# Patient Record
Sex: FEMALE | Race: WHITE | NOT HISPANIC OR LATINO | Employment: FULL TIME | URBAN - METROPOLITAN AREA
[De-identification: names, ages, dates, MRNs, and addresses within clinical notes are randomized per-mention and may not be internally consistent; named-entity substitution may affect disease eponyms.]

---

## 2021-08-05 ENCOUNTER — OFFICE VISIT (OUTPATIENT)
Dept: URGENT CARE | Facility: CLINIC | Age: 21
End: 2021-08-05
Payer: COMMERCIAL

## 2021-08-05 VITALS
TEMPERATURE: 98 F | BODY MASS INDEX: 22.73 KG/M2 | OXYGEN SATURATION: 100 % | RESPIRATION RATE: 18 BRPM | WEIGHT: 150 LBS | HEIGHT: 68 IN | DIASTOLIC BLOOD PRESSURE: 69 MMHG | HEART RATE: 91 BPM | SYSTOLIC BLOOD PRESSURE: 115 MMHG

## 2021-08-05 DIAGNOSIS — U07.1 COVID-19 VIRUS DETECTED: ICD-10-CM

## 2021-08-05 DIAGNOSIS — U07.1 COVID-19 VIRUS INFECTION: Primary | ICD-10-CM

## 2021-08-05 DIAGNOSIS — Z03.818 ENCOUNTER FOR OBSERVATION FOR SUSPECTED EXPOSURE TO OTHER BIOLOGICAL AGENTS RULED OUT: ICD-10-CM

## 2021-08-05 LAB
CTP QC/QA: YES
SARS-COV-2 RDRP RESP QL NAA+PROBE: POSITIVE

## 2021-08-05 PROCEDURE — 99204 OFFICE O/P NEW MOD 45 MIN: CPT | Mod: S$GLB,,, | Performed by: PHYSICIAN ASSISTANT

## 2021-08-05 PROCEDURE — 3078F DIAST BP <80 MM HG: CPT | Mod: CPTII,S$GLB,, | Performed by: PHYSICIAN ASSISTANT

## 2021-08-05 PROCEDURE — 3074F PR MOST RECENT SYSTOLIC BLOOD PRESSURE < 130 MM HG: ICD-10-PCS | Mod: CPTII,S$GLB,, | Performed by: PHYSICIAN ASSISTANT

## 2021-08-05 PROCEDURE — 3008F BODY MASS INDEX DOCD: CPT | Mod: CPTII,S$GLB,, | Performed by: PHYSICIAN ASSISTANT

## 2021-08-05 PROCEDURE — 1159F MED LIST DOCD IN RCRD: CPT | Mod: CPTII,S$GLB,, | Performed by: PHYSICIAN ASSISTANT

## 2021-08-05 PROCEDURE — U0002 COVID-19 LAB TEST NON-CDC: HCPCS | Mod: QW,S$GLB,, | Performed by: PHYSICIAN ASSISTANT

## 2021-08-05 PROCEDURE — 99204 PR OFFICE/OUTPT VISIT, NEW, LEVL IV, 45-59 MIN: ICD-10-PCS | Mod: S$GLB,,, | Performed by: PHYSICIAN ASSISTANT

## 2021-08-05 PROCEDURE — 1159F PR MEDICATION LIST DOCUMENTED IN MEDICAL RECORD: ICD-10-PCS | Mod: CPTII,S$GLB,, | Performed by: PHYSICIAN ASSISTANT

## 2021-08-05 PROCEDURE — U0002: ICD-10-PCS | Mod: QW,S$GLB,, | Performed by: PHYSICIAN ASSISTANT

## 2021-08-05 PROCEDURE — 3074F SYST BP LT 130 MM HG: CPT | Mod: CPTII,S$GLB,, | Performed by: PHYSICIAN ASSISTANT

## 2021-08-05 PROCEDURE — 3078F PR MOST RECENT DIASTOLIC BLOOD PRESSURE < 80 MM HG: ICD-10-PCS | Mod: CPTII,S$GLB,, | Performed by: PHYSICIAN ASSISTANT

## 2021-08-05 PROCEDURE — 3008F PR BODY MASS INDEX (BMI) DOCUMENTED: ICD-10-PCS | Mod: CPTII,S$GLB,, | Performed by: PHYSICIAN ASSISTANT

## 2021-08-05 RX ORDER — LEVONORGESTREL AND ETHINYL ESTRADIOL 0.15-0.03
1 KIT ORAL DAILY
COMMUNITY
Start: 2021-06-29 | End: 2022-06-21 | Stop reason: SDUPTHER

## 2021-08-05 RX ORDER — DULOXETIN HYDROCHLORIDE 20 MG/1
20 CAPSULE, DELAYED RELEASE ORAL
COMMUNITY
Start: 2020-12-04

## 2021-08-05 RX ORDER — PLECANATIDE 3 MG/1
1 TABLET ORAL DAILY
COMMUNITY
Start: 2020-09-20

## 2021-08-05 RX ORDER — LEVOTHYROXINE SODIUM 25 UG/1
1 TABLET ORAL EVERY MORNING
COMMUNITY
Start: 2020-12-04 | End: 2022-06-21

## 2023-01-26 ENCOUNTER — OFFICE VISIT (OUTPATIENT)
Dept: URGENT CARE | Facility: CLINIC | Age: 23
End: 2023-01-26
Payer: COMMERCIAL

## 2023-01-26 VITALS
BODY MASS INDEX: 21.95 KG/M2 | RESPIRATION RATE: 18 BRPM | DIASTOLIC BLOOD PRESSURE: 68 MMHG | HEIGHT: 68 IN | HEART RATE: 83 BPM | TEMPERATURE: 99 F | WEIGHT: 144.81 LBS | SYSTOLIC BLOOD PRESSURE: 99 MMHG | OXYGEN SATURATION: 99 %

## 2023-01-26 DIAGNOSIS — R05.9 COUGH, UNSPECIFIED TYPE: ICD-10-CM

## 2023-01-26 DIAGNOSIS — U07.1 COVID-19 VIRUS INFECTION: Primary | ICD-10-CM

## 2023-01-26 DIAGNOSIS — R09.81 SINUS CONGESTION: ICD-10-CM

## 2023-01-26 LAB
CTP QC/QA: YES
SARS-COV-2 AG RESP QL IA.RAPID: POSITIVE

## 2023-01-26 PROCEDURE — 1159F PR MEDICATION LIST DOCUMENTED IN MEDICAL RECORD: ICD-10-PCS | Mod: CPTII,S$GLB,, | Performed by: PHYSICIAN ASSISTANT

## 2023-01-26 PROCEDURE — 1159F MED LIST DOCD IN RCRD: CPT | Mod: CPTII,S$GLB,, | Performed by: PHYSICIAN ASSISTANT

## 2023-01-26 PROCEDURE — 3008F BODY MASS INDEX DOCD: CPT | Mod: CPTII,S$GLB,, | Performed by: PHYSICIAN ASSISTANT

## 2023-01-26 PROCEDURE — 1160F PR REVIEW ALL MEDS BY PRESCRIBER/CLIN PHARMACIST DOCUMENTED: ICD-10-PCS | Mod: CPTII,S$GLB,, | Performed by: PHYSICIAN ASSISTANT

## 2023-01-26 PROCEDURE — 3078F DIAST BP <80 MM HG: CPT | Mod: CPTII,S$GLB,, | Performed by: PHYSICIAN ASSISTANT

## 2023-01-26 PROCEDURE — 99213 OFFICE O/P EST LOW 20 MIN: CPT | Mod: S$GLB,,, | Performed by: PHYSICIAN ASSISTANT

## 2023-01-26 PROCEDURE — 3074F SYST BP LT 130 MM HG: CPT | Mod: CPTII,S$GLB,, | Performed by: PHYSICIAN ASSISTANT

## 2023-01-26 PROCEDURE — 1160F RVW MEDS BY RX/DR IN RCRD: CPT | Mod: CPTII,S$GLB,, | Performed by: PHYSICIAN ASSISTANT

## 2023-01-26 PROCEDURE — 99213 PR OFFICE/OUTPT VISIT, EST, LEVL III, 20-29 MIN: ICD-10-PCS | Mod: S$GLB,,, | Performed by: PHYSICIAN ASSISTANT

## 2023-01-26 PROCEDURE — 3074F PR MOST RECENT SYSTOLIC BLOOD PRESSURE < 130 MM HG: ICD-10-PCS | Mod: CPTII,S$GLB,, | Performed by: PHYSICIAN ASSISTANT

## 2023-01-26 PROCEDURE — 3078F PR MOST RECENT DIASTOLIC BLOOD PRESSURE < 80 MM HG: ICD-10-PCS | Mod: CPTII,S$GLB,, | Performed by: PHYSICIAN ASSISTANT

## 2023-01-26 PROCEDURE — 3008F PR BODY MASS INDEX (BMI) DOCUMENTED: ICD-10-PCS | Mod: CPTII,S$GLB,, | Performed by: PHYSICIAN ASSISTANT

## 2023-01-26 PROCEDURE — 87811 SARS CORONAVIRUS 2 ANTIGEN POCT, MANUAL READ: ICD-10-PCS | Mod: QW,S$GLB,, | Performed by: PHYSICIAN ASSISTANT

## 2023-01-26 PROCEDURE — 87811 SARS-COV-2 COVID19 W/OPTIC: CPT | Mod: QW,S$GLB,, | Performed by: PHYSICIAN ASSISTANT

## 2023-01-26 RX ORDER — PROMETHAZINE HYDROCHLORIDE AND DEXTROMETHORPHAN HYDROBROMIDE 6.25; 15 MG/5ML; MG/5ML
5 SYRUP ORAL EVERY 6 HOURS PRN
Qty: 118 ML | Refills: 0 | Status: SHIPPED | OUTPATIENT
Start: 2023-01-26 | End: 2023-09-13

## 2023-01-26 NOTE — LETTER
[address] ? [place], [address] ? Phone [phone] ? Fax             Return to Work/School    Patient: Rosalva Strange  YOB: 2000   Date: 01/26/2023      To Whom It May Concern:     Rosalva Strange was in contact with/seen in my office on 01/26/2023. COVID-19 is present in our communities across the state. Not all patients are eligible or appropriate to be tested. In this situation, she meets the following criteria:     Rosalva Strange has met the criteria for COVID-19 testing and has a POSITIVE result. She can return to work/school once they are asymptomatic for 24 hours without the use of fever reducing medications AND at least 5 days from the start of symptoms- may return on 1/29/23.  Patient does NOT need to be re-tested to return to work/school.        If you have any questions or concerns, or if I can be of further assistance, please do not hesitate to contact me.     Sincerely,    Gypsy Jorgensen PA-C

## 2023-01-26 NOTE — PATIENT INSTRUCTIONS
You have tested positive for COVID-19 today.      Please note that patients who test positive for COVID-19 are required by the CDC to undergo isolation for 5 days after their symptoms first began.   - If you tested positive and do not have symptoms, you must isolate for 5 days starting on the day of the positive test.   - If you tested positive and have symptoms, you must isolate for 5 days starting on the day of the first symptoms,  not the day of the positive test.    This is the most important part, both the CDC and the [facility] emphasize that you do not test out of isolation.  In fact, we do not retest if you were positive in the last 90 days.    After 5 days, if your symptoms have improved and you have not had fever on day 5, you can return to the community on day 6- NO TESTING REQUIRED!     After your 5 days of isolation are completed, the CDC recommends strict mask use for the first 5 days that you come out of isolation.     During quarantine:   Separate yourself from other people and animals in your home.  Call ahead before visiting your doctor.  Wear a facemask.  Cover your coughs and sneezes.  Wash your hands often with soap and water; hand  can be used, too.  Avoid sharing personal household items.  Wipe down surfaces used daily.  Monitor your symptoms. Seek prompt medical attention if your illness is worsening (e.g., difficulty breathing).   Before seeking care, call your healthcare provider.  If you have a medical emergency and need to call 911, notify the dispatch personnel that you have, or are being evaluated for COVID-19. If possible, put on a facemask before emergency medical services arrive.         CDC Testing and Quarantine Guidelines for household members, intimate partners, and caregivers in a home setting of a known-positive COVID-19 person:    ·     A 'close exposure' is defined as anyone who has had an exposure (masked or unmasked) to a known COVID -19 positive person within 6 feet of  someone for a cumulative total of 15 minutes or more over a 24-hour period.    ·     Vaccinated: patients who have been boosted or completed the primary series of Pfizer or Moderna vaccine within the last 6 months or completed the primary series of J&J vaccine within the last 2 months and/or had a positive test within 90 days   - do NOT need to quarantine after contact with someone who had COVID-19 unless they have symptoms.   - should get tested 3-5 days after their exposure (if they have not had a positive test within the last 90 days), even if they don't have symptoms and wear a mask indoors in public for 10 days following exposure or until their test result is negative on day 5.  - If you develop symptoms test and quarantine.    ·     Unvaccinated, or are more than six months out from their second mRNA dose (or more than 2 months after the J&J vaccine) and not yet boosted,  and/or NOT had a positive test within 90 days and meet 'close exposure'  - you are required by CDC guidelines to quarantine for at least 5 days from time of exposure followed by 5 days of strict masking. It is recommended, but not required to test after 5 days, unless you develop symptoms, in which case you should test at that time.  - If you do decide to test at 5 days and are asymptomatic, the risk is that if you test without symptoms (on Day 5 for example) and you are positive, your 5 day isolation begins on that day, and you turned your 5 day quarantine into 10 days.  - If your exposure does not meet the above definition, you can return to your normal daily activities to include social distancing, wearing a mask and frequent handwashing.       Close contacts should also follow these recommendations:  Make sure that you understand and can help the patient follow their provider's instructions for medication(s) and care. You should help the patient with basic needs in the home and provide support for getting groceries, prescriptions, and  other personal needs.  Monitor the patient's symptoms. If the patient is getting sicker, call his or her healthcare provider and tell them that the patient has laboratory-confirmed COVID-19. If the patient has a medical emergency and you need to call 911, notify the dispatch personnel that the patient has, or is being evaluated for COVID-19.  Household members should stay in another room or be  from the patient. Household members should use a separate bedroom and bathroom, if available.  Prohibit visitors.  Household members should care for any pets in the home.  Make sure that shared spaces in the home have good air flow, such as by an air conditioner or an opened window, weather permitting.  Perform hand hygiene frequently. Wash your hands often with soap and water for at least 20 seconds or use an alcohol-based hand  (that contains > 60% alcohol) covering all surfaces of your hands and rubbing them together until they feel dry. Soap and water should be used preferentially.  Avoid touching your eyes, nose, and mouth.  The patient should wear a facemask. If the patient is not able to wear a facemask (for example, because it causes trouble breathing), caregivers should wear a mask when they are in the same room as the patient.  Wear a disposable facemask and gloves when you touch or have contact with the patient's blood, stool, or body fluids, such as saliva, sputum, nasal mucus, vomit, urine.  Throw out disposable facemasks and gloves after using them. Do not reuse.  When removing personal protective equipment, first remove and dispose of gloves. Then, immediately clean your hands with soap and water or alcohol-based hand . Next, remove and dispose of facemask, and immediately clean your hands again with soap and water or alcohol-based hand .  You should not share dishes, drinking glasses, cups, eating utensils, towels, bedding, or other items with the patient. After the patient  uses these items, you should wash them thoroughly (see below Wash laundry thoroughly).  Clean all high-touch surfaces, such as counters, tabletops, doorknobs, bathroom fixtures, toilets, phones, keyboards, tablets, and bedside tables, every day. Also, clean any surfaces that may have blood, stool, or body fluids on them.  Use a household cleaning spray or wipe, according to the label instructions. Labels contain instructions for safe and effective use of the cleaning product including precautions you should take when applying the product, such as wearing gloves and making sure you have good ventilation during use of the product.  Wash laundry thoroughly.  Immediately remove and wash clothes or bedding that have blood, stool, or body fluids on them.  Wear disposable gloves while handling soiled items and keep soiled items away from your body. Clean your hands (with soap and water or an alcohol-based hand ) immediately after removing your gloves.  Read and follow directions on labels of laundry or clothing items and detergent. In general, using a normal laundry detergent according to washing machine instructions and dry thoroughly using the warmest temperatures recommended on the clothing label.  Place all used disposable gloves, facemasks, and other contaminated items in a lined container before disposing of them with other household waste. Clean your hands (with soap and water or an alcohol-based hand ) immediately after handling these items. Soap and water should be used preferentially if hands are visibly dirty.  Discuss any additional questions with your state or local health department or healthcare provider. Check available hours when contacting your local health department.     You must understand that you've received an Urgent Care treatment only and that you may be released before all your medical problems are known or treated. You, the patient, will arrange for follow up care as  instructed. Follow up with your PCP or specialty clinic as directed within 2-5 days if not improved or as needed.  You can call [phone] to schedule an appointment with the appropriate provider.  If your condition worsens we recommend that you receive another evaluation at the emergency room immediately or contact your primary medical clinics after hours call service to discuss your concerns.  Please return here or go to the Emergency Department for any concerns or worsening of condition.       If we discussed the COVID surveillance/home monitoring program, you will also get a call from [facility] at the [place] or [facility] location to get a pulse oximeter to monitor your blood oxygen level.  This will be followed by a COVID surveillance team daily through [employer] (available on computer or through mobile hermila).

## 2023-01-26 NOTE — PROGRESS NOTES
"Subjective:       Patient ID: Rosalva Strange is a 22 y.o. female.    Vitals:  height is 5' 8.19" (1.732 m) and weight is 65.7 kg (144 lb 13.5 oz). Her tympanic temperature is 98.8 °F (37.1 °C). Her blood pressure is 99/68 and her pulse is 83. Her respiration is 18 and oxygen saturation is 99%.     Chief Complaint: Sinus Problem    Pt is here today with sinus congestion and cough x 5 days. Pt took an at home covid test which came back positive.  No fever, wheezing, shortness of breath.  Cough seems to be worse at night.  Patient is still waking up in the night despite taking NyQuil.    Sinus Problem  This is a new problem. The current episode started in the past 7 days. The problem is unchanged. There has been no fever. Her pain is at a severity of 0/10. She is experiencing no pain. Associated symptoms include congestion, coughing, a hoarse voice, sinus pressure and a sore throat. Pertinent negatives include no chills, diaphoresis, ear pain, headaches, neck pain, shortness of breath, sneezing or swollen glands. Past treatments include acetaminophen (dayquil, nyquil, sudafed, mucinex dm, cough drops). The treatment provided no relief.     Constitution: Negative for chills, sweating and fever.   HENT:  Positive for congestion, sinus pressure and sore throat. Negative for ear pain.    Neck: Negative for neck pain.   Cardiovascular: Negative.    Respiratory:  Positive for cough and sputum production. Negative for shortness of breath.    Gastrointestinal: Negative.    Allergic/Immunologic: Negative for sneezing.   Neurological:  Negative for headaches.     Objective:      Physical Exam   Constitutional: She appears well-developed.  Non-toxic appearance. She appears ill. No distress.   HENT:   Head: Normocephalic and atraumatic.   Ears:   Right Ear: Tympanic membrane, external ear and ear canal normal.   Left Ear: Tympanic membrane, external ear and ear canal normal.   Nose: Congestion present.   Eyes: Conjunctivae and " EOM are normal.   Neck: Neck supple.   Pulmonary/Chest: Effort normal and breath sounds normal.   Abdominal: Normal appearance.   Musculoskeletal: Normal range of motion.         General: Normal range of motion.   Neurological: no focal deficit. She is alert. She displays no weakness. Gait normal.   Skin: Skin is warm, dry, not diaphoretic, not pale and no rash.   Psychiatric: Her behavior is normal.       Results for orders placed or performed in visit on 01/26/23   SARS Coronavirus 2 Antigen, POCT Manual Read   Result Value Ref Range    SARS Coronavirus 2 Antigen Positive (A) Negative     Acceptable Yes        Assessment:       1. COVID-19 virus infection    2. Sinus congestion    3. Cough, unspecified type            Plan:       - Rapid COVID-19 positive    - Covid Risk Score:  0  Pt is considered low risk (score < 3) and therefore does not meet criteria for Paxlovid.  - Advised patient to stay home and self quarantine for 5 days from onset of symptoms. Advised must be fever free for at least 24 hours to discontinue isolation.  -Tylenol as needed for fever or pain.   - Promethazine dm nightly prn; discussed may cause drowsiness.   - Strict ED precautions given for any emergent symptoms.    COVID-19 virus infection    Sinus congestion  -     SARS Coronavirus 2 Antigen, POCT Manual Read    Cough, unspecified type  -     promethazine-dextromethorphan (PROMETHAZINE-DM) 6.25-15 mg/5 mL Syrp; Take 5 mLs by mouth every 6 (six) hours as needed (cough). May cause drowsiness.  Dispense: 118 mL; Refill: 0

## 2023-08-11 ENCOUNTER — HOSPITAL ENCOUNTER (OUTPATIENT)
Dept: RADIOLOGY | Facility: CLINIC | Age: 23
Discharge: HOME | End: 2023-08-11
Attending: NURSE PRACTITIONER
Payer: COMMERCIAL

## 2023-08-11 ENCOUNTER — OFFICE VISIT (OUTPATIENT)
Dept: URGENT CARE | Facility: CLINIC | Age: 23
End: 2023-08-11
Payer: COMMERCIAL

## 2023-08-11 VITALS
WEIGHT: 144 LBS | HEART RATE: 75 BPM | RESPIRATION RATE: 18 BRPM | TEMPERATURE: 98 F | SYSTOLIC BLOOD PRESSURE: 112 MMHG | BODY MASS INDEX: 21.82 KG/M2 | HEIGHT: 68 IN | OXYGEN SATURATION: 99 % | DIASTOLIC BLOOD PRESSURE: 73 MMHG

## 2023-08-11 DIAGNOSIS — M79.672 LEFT FOOT PAIN: Primary | ICD-10-CM

## 2023-08-11 PROCEDURE — 96372 PR INJECTION,THERAP/PROPH/DIAG2ST, IM OR SUBCUT: ICD-10-PCS | Mod: S$GLB,,, | Performed by: NURSE PRACTITIONER

## 2023-08-11 PROCEDURE — 73630 X-RAY EXAM OF FOOT: CPT | Mod: LT,S$GLB,, | Performed by: STUDENT IN AN ORGANIZED HEALTH CARE EDUCATION/TRAINING PROGRAM

## 2023-08-11 PROCEDURE — 73630 XR FOOT COMPLETE 3 VIEW LEFT: ICD-10-PCS | Mod: LT,S$GLB,, | Performed by: STUDENT IN AN ORGANIZED HEALTH CARE EDUCATION/TRAINING PROGRAM

## 2023-08-11 PROCEDURE — 99214 OFFICE O/P EST MOD 30 MIN: CPT | Mod: 25,S$GLB,, | Performed by: NURSE PRACTITIONER

## 2023-08-11 PROCEDURE — 96372 THER/PROPH/DIAG INJ SC/IM: CPT | Mod: S$GLB,,, | Performed by: NURSE PRACTITIONER

## 2023-08-11 PROCEDURE — 99214 PR OFFICE/OUTPT VISIT, EST, LEVL IV, 30-39 MIN: ICD-10-PCS | Mod: 25,S$GLB,, | Performed by: NURSE PRACTITIONER

## 2023-08-11 RX ORDER — KETOROLAC TROMETHAMINE 30 MG/ML
30 INJECTION, SOLUTION INTRAMUSCULAR; INTRAVENOUS
Status: COMPLETED | OUTPATIENT
Start: 2023-08-11 | End: 2023-08-11

## 2023-08-11 RX ADMIN — KETOROLAC TROMETHAMINE 30 MG: 30 INJECTION, SOLUTION INTRAMUSCULAR; INTRAVENOUS at 07:08

## 2023-08-11 NOTE — PROGRESS NOTES
"Subjective:      Patient ID: Rosalva Strange is a 23 y.o. female.    Vitals:  height is 5' 8.19" (1.732 m) and weight is 65.3 kg (144 lb). Her oral temperature is 98.3 °F (36.8 °C). Her blood pressure is 112/73 and her pulse is 75. Her respiration is 18 and oxygen saturation is 99%.     Chief Complaint: Foot Injury    23 year old female presents for evaluation of left foot pain. Reports getting out of the bathtub @ 5 pm when she hit her foot on the cermic bath tub. No pain initially but as time progressed noted inability to bear weight without pain.  Pain localized to midfoot 1-2/10 at rest, increases 10/10 when standing. OTC Salonpas patch and Tylenol without relief. Reports past history of left foot fracture with similar pain    Foot Injury   The incident occurred 1 to 3 hours ago. The incident occurred at home. The injury mechanism is unknown. The pain is present in the left foot. The quality of the pain is described as aching. The pain is at a severity of 10/10. The pain is moderate. The pain has been Constant since onset. Pertinent negatives include no loss of motion, loss of sensation, muscle weakness, numbness or tingling. She reports no foreign bodies present. The symptoms are aggravated by movement and weight bearing. She has tried acetaminophen for the symptoms. The treatment provided no relief.       Constitution: Negative.   HENT: Negative.     Neck: neck negative.   Cardiovascular: Negative.    Eyes: Negative.    Respiratory: Negative.     Gastrointestinal: Negative.    Endocrine: negative.   Genitourinary: Negative.    Musculoskeletal:  Positive for pain (left foot) and joint pain (left foot).   Skin: Negative.  Negative for wound, abrasion, erythema and bruising.   Allergic/Immunologic: Negative.    Neurological: Negative.  Negative for numbness.   Hematologic/Lymphatic: Negative.    Psychiatric/Behavioral: Negative.        Objective:     Physical Exam   Constitutional: She is oriented to person, " place, and time.  Non-toxic appearance. She does not appear ill. No distress. normal  HENT:   Head: Normocephalic and atraumatic.   Eyes: Conjunctivae are normal. Pupils are equal, round, and reactive to light. Right eye exhibits no discharge. Left eye exhibits no discharge. No scleral icterus. Extraocular movement intact   Neck: Neck supple.   Cardiovascular: Normal rate.   Pulmonary/Chest: Effort normal.   Abdominal: Normal appearance.   Musculoskeletal:         General: Tenderness (left foot dorsal aspect) present. No swelling or deformity.      Right lower leg: No edema.      Left lower leg: No edema.   Neurological: no focal deficit. She is alert and oriented to person, place, and time.   Skin: Skin is warm, dry and not diaphoretic. No erythema   Psychiatric: Her behavior is normal. Mood, judgment and thought content normal.   Nursing note and vitals reviewed.    X-Ray Foot Complete 3 view Left    Result Date: 8/11/2023  EXAM: XR FOOT COMPLETE 3 VIEW LEFT CLINICAL HISTORY: Pain. FINDINGS:  No acute displaced fracture identified.  Joint alignment is anatomic.  No significant arthritic changes are present.      No definite acute osseous abnormality.  Correlation and further evaluation as warranted. Finalized on: 8/11/2023 7:59 PM By:  Jair Peralta MD [facility]# [id number]      2023-08-11 20:01:[id number]    [facility]     Assessment:     1. Left foot pain        Plan:     Patient presents with left foot pain after injury. Decision to administer Ketoralac 30 mg IM injection to decrease pain and inflammation. Decision to perform left foot xray to rule out fracture. (-) findings discussed. Plan is to manage pain and prevent worsening. Discussed with patient and mother who verbalize understanding.     Left foot pain  -     X-Ray Foot Complete 3 view Left; Future; Expected date: 08/11/2023  -     ketorolac injection 30 mg                Patient Instructions   Rest  Apply ice to left foot  Ace wrap left foot  Elevate left foot  Tylenol  every 6 hours as needed for pain  You received a Ketoralac injection in clinic today-AVOID additional NSAID use today  Typical course and duration of illness discussed  Follow up as needed

## 2023-08-12 NOTE — PATIENT INSTRUCTIONS
Rest  Apply ice to left foot  Ace wrap left foot  Elevate left foot  Tylenol every 6 hours as needed for pain  You received a Ketoralac injection in clinic today-AVOID additional NSAID use today  Typical course and duration of illness discussed  Follow up as needed

## 2023-08-14 ENCOUNTER — TELEPHONE (OUTPATIENT)
Dept: URGENT CARE | Facility: CLINIC | Age: 23
End: 2023-08-14
Payer: COMMERCIAL

## 2024-07-04 ENCOUNTER — OFFICE VISIT (OUTPATIENT)
Dept: URGENT CARE | Facility: CLINIC | Age: 24
End: 2024-07-04
Payer: COMMERCIAL

## 2024-07-04 ENCOUNTER — TELEPHONE (OUTPATIENT)
Dept: URGENT CARE | Facility: CLINIC | Age: 24
End: 2024-07-04

## 2024-07-04 VITALS
DIASTOLIC BLOOD PRESSURE: 71 MMHG | HEIGHT: 70 IN | TEMPERATURE: 98 F | WEIGHT: 145.5 LBS | SYSTOLIC BLOOD PRESSURE: 124 MMHG | RESPIRATION RATE: 20 BRPM | OXYGEN SATURATION: 100 % | HEART RATE: 66 BPM | BODY MASS INDEX: 20.83 KG/M2

## 2024-07-04 DIAGNOSIS — T78.40XA ALLERGIC REACTION, INITIAL ENCOUNTER: Primary | ICD-10-CM

## 2024-07-04 DIAGNOSIS — R21 RASH: ICD-10-CM

## 2024-07-04 DIAGNOSIS — L50.9 URTICARIA: ICD-10-CM

## 2024-07-04 RX ORDER — METHYLPREDNISOLONE 4 MG/1
TABLET ORAL
Qty: 1 EACH | Refills: 0 | Status: SHIPPED | OUTPATIENT
Start: 2024-07-04

## 2024-07-04 RX ORDER — METHYLPREDNISOLONE 4 MG/1
TABLET ORAL
Qty: 1 EACH | Refills: 0 | Status: SHIPPED | OUTPATIENT
Start: 2024-07-04 | End: 2024-07-04

## 2024-07-04 RX ORDER — DEXAMETHASONE SODIUM PHOSPHATE 100 MG/10ML
10 INJECTION INTRAMUSCULAR; INTRAVENOUS
Status: COMPLETED | OUTPATIENT
Start: 2024-07-04 | End: 2024-07-04

## 2024-07-04 RX ADMIN — DEXAMETHASONE SODIUM PHOSPHATE 10 MG: 100 INJECTION INTRAMUSCULAR; INTRAVENOUS at 08:07

## 2024-07-04 NOTE — PATIENT INSTRUCTIONS
Please drink plenty of fluids. Please get plenty of rest.  Please go to the Emergency Department for any concerns or worsening of condition.  Please take over the counter Pepcid or Zantac as directed for the next 24-72hours as needed.  If you were given a steroid shot in the clinic and have also been given a prescription for a steroid such as Prednisone or a Medrol Dose Pack, please begin taking them tomorrow. If you received a steroid shot today - this can elevate your blood pressure, elevate your blood sugar, water weight gain, nervous energy, redness to the face and dimpling of the skin where the shot goes in.   If you have a localized reaction it is ok to apply OTC  topical creams (e.g. Cortaid) as directed to the affected area. You can also use a cool compress to reduce itching.   Please take Claritin or Zyrtec or Allegra (24 hours) twice a day.  You can add Benadryl or Hydroxyzine as needed for itching, however these may make you drowsy, so do not  drive or operate heavy equipment or machinery while taking these medications.  In the future make sure to keep benadryl with you or an Epi-pen if you were prescribed one.     Please arrange follow up with your primary medical clinic as soon as possible. You must understand that you've received an Urgent Care treatment only and that you may be released before all of your medical problems are known or treated. You, the patient, will arrange for follow up as instructed. If your symptoms worsen or fail to improve you should go to the Emergency Room.

## 2024-07-04 NOTE — TELEPHONE ENCOUNTER
Pt called in wanting her medication to be sent to another pharmacy due to the original one being closed for the holiday. She would like to be sent to the [facility] on [address] in [place].

## 2024-07-04 NOTE — PROGRESS NOTES
"Subjective:      Patient ID: Rosalva Strange is a 23 y.o. female.    Vitals:  height is 5' 9.65" (1.769 m) and weight is 66 kg (145 lb 8.1 oz). Her oral temperature is 98.2 °F (36.8 °C). Her blood pressure is 124/71 and her pulse is 66. Her respiration is 20 and oxygen saturation is 100%.     Chief Complaint: Urticaria (Chest, neck, arm and legs)    Rosalva Strange is a 23 year old female whom presents to urgent care for evaluation of hives on neck, chest, arms and legs. Patient reports she noticed the hives on 06/29/24. The rash began spreading on 07/01/24.Patient reports she started applying Cortizone cream on chest and neck. 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 Patient has tried benadryl, claritin, Cortizone cream  and ice without any relief to affected areas. Patient denies any sore throat or shortness of breathe. She denies any new products, foods or medications.     Urticaria  This is a new problem. The current episode started in the past 7 days. The problem has been gradually worsening since onset. Location: arms, neck, chest and legs. It is unknown if there was an exposure to a precipitant. Associated symptoms include fatigue. Pertinent negatives include no anorexia, congestion, cough, diarrhea, eye pain, facial edema, fever, joint pain, nail changes, rhinorrhea, shortness of breath, sore throat or vomiting. Past treatments include anti-itch cream and antihistamine. The treatment provided mild relief. Her past medical history is significant for eczema. There is no history of allergies, asthma or varicella.       Constitution: Positive for fatigue. Negative for fever.   HENT:  Negative for congestion and sore throat.    Eyes:  Negative for eye pain.   Respiratory:  Negative for cough and shortness of breath.    Gastrointestinal:  Negative for vomiting and diarrhea.   Skin:  Positive for hives.   Allergic/Immunologic: Positive for hives.      Objective:     Physical Exam   Constitutional: " She is oriented to person, place, and time. She appears well-developed. She is cooperative.   HENT:   Head: Normocephalic and atraumatic.   Ears:   Right Ear: Hearing, tympanic membrane, external ear and ear canal normal.   Left Ear: Hearing, tympanic membrane, external ear and ear canal normal.   Nose: Nose normal. No mucosal edema or nasal deformity. No epistaxis. Right sinus exhibits no maxillary sinus tenderness and no frontal sinus tenderness. Left sinus exhibits no maxillary sinus tenderness and no frontal sinus tenderness.   Mouth/Throat: Uvula is midline, oropharynx is clear and moist and mucous membranes are normal. No trismus in the jaw. Normal dentition. No uvula swelling.   Eyes: Conjunctivae and lids are normal.   Neck: Trachea normal and phonation normal. Neck supple.   Cardiovascular: Normal rate, regular rhythm, normal heart sounds and normal pulses.   Pulmonary/Chest: Effort normal and breath sounds normal.   Abdominal: Normal appearance and bowel sounds are normal. Soft.   Musculoskeletal: Normal range of motion.         General: Normal range of motion.   Neurological: She is alert and oriented to person, place, and time. She exhibits normal muscle tone.   Skin: Skin is warm, dry, intact, rash and urticarial.         Comments: Urticarial rash noted diffusely throughout the patients body.    Psychiatric: Her speech is normal and behavior is normal. Judgment and thought content normal.   Nursing note and vitals reviewed.      Assessment:     1. Allergic reaction, initial encounter    2. Urticaria    3. Rash        Plan:       Allergic reaction, initial encounter  -     dexAMETHasone injection 10 mg  -     methylPREDNISolone (MEDROL DOSEPACK) 4 mg tablet; use as directed  Dispense: 1 each; Refill: 0    Urticaria  -     methylPREDNISolone (MEDROL DOSEPACK) 4 mg tablet; use as directed  Dispense: 1 each; Refill: 0    Rash  -     methylPREDNISolone (MEDROL DOSEPACK) 4 mg tablet; use as directed   Dispense: 1 each; Refill: 0          Medical Decision Making:   Differential Diagnosis:   Allergic vs irritant contact dermatitis, psoriasis, scabies, allergic reaction, eczema  Urgent Care Management:  Previous encounters were independently reviewed. No signs of anaphylaxis, no tongue/ lip swelling, no stridor or airway involvement, no dyspnea, no wheezing, no hives, no vomiting or gastrointestinal complaints, blood pressure is within normal limits. Additional plan of care as outlined above. The patient was counseled extensively on hives and possible recurrence and the need to follow up with the primary care provider and allergist for further evaluation of symptoms if they persist. All questions were answered. Follow up and emergency department precautions were reviewed. Treatment plan as well as options and alternatives reviewed and discussed with patient. All of the patients questions and concerns were addressed.The patient verbalized understanding and agrees with the discussed plan of care. Patient remained stable and was discharged in no acute distress.                 Patient Instructions   Please drink plenty of fluids. Please get plenty of rest.  Please go to the Emergency Department for any concerns or worsening of condition.  Please take over the counter Pepcid or Zantac as directed for the next 24-72hours as needed.  If you were given a steroid shot in the clinic and have also been given a prescription for a steroid such as Prednisone or a Medrol Dose Pack, please begin taking them tomorrow. If you received a steroid shot today - this can elevate your blood pressure, elevate your blood sugar, water weight gain, nervous energy, redness to the face and dimpling of the skin where the shot goes in.   If you have a localized reaction it is ok to apply OTC  topical creams (e.g. Cortaid) as directed to the affected area. You can also use a cool compress to reduce itching.   Please take Claritin or Zyrtec or  Allegra (24 hours) twice a day.  You can add Benadryl or Hydroxyzine as needed for itching, however these may make you drowsy, so do not  drive or operate heavy equipment or machinery while taking these medications.  In the future make sure to keep benadryl with you or an Epi-pen if you were prescribed one.     Please arrange follow up with your primary medical clinic as soon as possible. You must understand that you've received an Urgent Care treatment only and that you may be released before all of your medical problems are known or treated. You, the patient, will arrange for follow up as instructed. If your symptoms worsen or fail to improve you should go to the Emergency Room.